# Patient Record
Sex: MALE | Race: BLACK OR AFRICAN AMERICAN
[De-identification: names, ages, dates, MRNs, and addresses within clinical notes are randomized per-mention and may not be internally consistent; named-entity substitution may affect disease eponyms.]

---

## 2019-06-15 ENCOUNTER — HOSPITAL ENCOUNTER (EMERGENCY)
Dept: HOSPITAL 72 - EMR | Age: 29
Discharge: HOME | End: 2019-06-15
Payer: SELF-PAY

## 2019-06-15 VITALS — BODY MASS INDEX: 25.77 KG/M2 | HEIGHT: 70 IN | WEIGHT: 180 LBS

## 2019-06-15 VITALS — DIASTOLIC BLOOD PRESSURE: 86 MMHG | SYSTOLIC BLOOD PRESSURE: 120 MMHG

## 2019-06-15 DIAGNOSIS — S00.12XA: ICD-10-CM

## 2019-06-15 DIAGNOSIS — S09.90XA: Primary | ICD-10-CM

## 2019-06-15 DIAGNOSIS — S02.2XXA: ICD-10-CM

## 2019-06-15 DIAGNOSIS — F10.920: ICD-10-CM
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
## 2019-06-15 PROCEDURE — 70486 CT MAXILLOFACIAL W/O DYE: CPT

## 2019-06-15 PROCEDURE — 99284 EMERGENCY DEPT VISIT MOD MDM: CPT

## 2019-06-15 PROCEDURE — 70450 CT HEAD/BRAIN W/O DYE: CPT
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
